# Patient Record
Sex: FEMALE | URBAN - METROPOLITAN AREA
[De-identification: names, ages, dates, MRNs, and addresses within clinical notes are randomized per-mention and may not be internally consistent; named-entity substitution may affect disease eponyms.]

---

## 2023-10-19 ENCOUNTER — APPOINTMENT (RX ONLY)
Dept: URBAN - METROPOLITAN AREA CLINIC 35 | Facility: CLINIC | Age: 38
Setting detail: DERMATOLOGY
End: 2023-10-19

## 2023-10-19 DIAGNOSIS — Z41.9 ENCOUNTER FOR PROCEDURE FOR PURPOSES OTHER THAN REMEDYING HEALTH STATE, UNSPECIFIED: ICD-10-CM

## 2023-10-19 PROCEDURE — ? ADDITIONAL NOTES

## 2023-10-19 PROCEDURE — ? PHOTO-DOCUMENTATION

## 2023-10-19 PROCEDURE — ? BOTOX

## 2023-10-19 PROCEDURE — ? COSMETIC CONSULTATION: BOTOX

## 2023-10-19 PROCEDURE — ? DIAGNOSIS COMMENT

## 2023-10-19 ASSESSMENT — LOCATION DETAILED DESCRIPTION DERM
LOCATION DETAILED: LEFT CENTRAL EYEBROW
LOCATION DETAILED: RIGHT CENTRAL EYEBROW
LOCATION DETAILED: SUPERIOR MID FOREHEAD
LOCATION DETAILED: LEFT MEDIAL EYEBROW
LOCATION DETAILED: LEFT LATERAL FOREHEAD
LOCATION DETAILED: GLABELLA
LOCATION DETAILED: RIGHT SUPERIOR FOREHEAD
LOCATION DETAILED: RIGHT INFERIOR MEDIAL FOREHEAD
LOCATION DETAILED: RIGHT LATERAL FOREHEAD
LOCATION DETAILED: LEFT SUPERIOR FOREHEAD

## 2023-10-19 ASSESSMENT — LOCATION SIMPLE DESCRIPTION DERM
LOCATION SIMPLE: GLABELLA
LOCATION SIMPLE: LEFT FOREHEAD
LOCATION SIMPLE: RIGHT EYEBROW
LOCATION SIMPLE: LEFT EYEBROW
LOCATION SIMPLE: SUPERIOR FOREHEAD
LOCATION SIMPLE: RIGHT FOREHEAD

## 2023-10-19 ASSESSMENT — LOCATION ZONE DERM: LOCATION ZONE: FACE

## 2023-10-19 NOTE — HPI: COSMETIC (BOTOX)
Have You Had Botox Before?: has not had botox
Additional History: Pt states never had Botox before. Pt is interested in forehead and between eyebrows, would like lowest unit. Pt denies pregnancy at this time.

## 2023-10-19 NOTE — PROCEDURE: BOTOX
Periorbital Skin Units: 0
Dilution (U/0.1 Cc): 4
Show Glabellar Units: Yes
Lot #: [id number]
Show Mentalis Units: No
Incrementing Botox Units: By 0.5 Units
Glabellar Complex Units: 16
Forehead Units: 10
Expiration Date (Month Year): 02/2025
Price (Use Numbers Only, No Special Characters Or $): 279
Detail Level: Detailed
Additional Area 1 Location: medial corrugators (total)
Additional Area 2 Location: lateral corrugators (total)
Consent: Written consent obtained. Risks include but not limited to lid/brow ptosis, bruising, swelling, diplopia, temporary effect, incomplete chemical denervation.
Post-Care Instructions: Patient instructed to not lie down for 4 hours and limit physical activity for 24 hours.

## 2023-10-19 NOTE — PROCEDURE: ADDITIONAL NOTES
Detail Level: Simple
Additional Notes: Discussed with pt that she has strength of frontalis on the L>R and pt notes she expresses through raising brows; discouraged injection too low on the frontalis to decrease risk of ptosis; discussed adding additional units on the L but as pt prefers to keep her movements as natural as possible and that she prefers to maintain movement , injected symmetrically
Render Risk Assessment In Note?: no

## 2023-10-19 NOTE — PROCEDURE: DIAGNOSIS COMMENT
Comment: Discussed laser treatment and chemical peels, recommended to follow up with Dr. Dickens if pt opts for CO2 laser or peels. \\nDiscussed Botox is temporary and lasts 3 months, all questions answered
Detail Level: Simple
Render Risk Assessment In Note?: no

## 2024-03-21 ENCOUNTER — APPOINTMENT (RX ONLY)
Dept: URBAN - METROPOLITAN AREA CLINIC 35 | Facility: CLINIC | Age: 39
Setting detail: DERMATOLOGY
End: 2024-03-21

## 2024-03-21 DIAGNOSIS — Z41.9 ENCOUNTER FOR PROCEDURE FOR PURPOSES OTHER THAN REMEDYING HEALTH STATE, UNSPECIFIED: ICD-10-CM

## 2024-03-21 PROCEDURE — ? COSMETIC CONSULTATION: BOTOX

## 2024-03-21 PROCEDURE — ? DIAGNOSIS COMMENT

## 2024-03-21 PROCEDURE — ? BOTOX

## 2024-03-21 PROCEDURE — ? ADDITIONAL NOTES

## 2024-03-21 PROCEDURE — ? PHOTO-DOCUMENTATION

## 2024-03-21 ASSESSMENT — LOCATION SIMPLE DESCRIPTION DERM
LOCATION SIMPLE: SUPERIOR FOREHEAD
LOCATION SIMPLE: GLABELLA
LOCATION SIMPLE: RIGHT FOREHEAD
LOCATION SIMPLE: LEFT FOREHEAD
LOCATION SIMPLE: LEFT EYEBROW
LOCATION SIMPLE: RIGHT EYEBROW

## 2024-03-21 ASSESSMENT — LOCATION DETAILED DESCRIPTION DERM
LOCATION DETAILED: SUPERIOR MID FOREHEAD
LOCATION DETAILED: RIGHT LATERAL FOREHEAD
LOCATION DETAILED: RIGHT CENTRAL EYEBROW
LOCATION DETAILED: RIGHT SUPERIOR FOREHEAD
LOCATION DETAILED: LEFT CENTRAL EYEBROW
LOCATION DETAILED: GLABELLA
LOCATION DETAILED: LEFT LATERAL FOREHEAD
LOCATION DETAILED: LEFT SUPERIOR FOREHEAD
LOCATION DETAILED: RIGHT INFERIOR MEDIAL FOREHEAD
LOCATION DETAILED: LEFT MEDIAL EYEBROW

## 2024-03-21 ASSESSMENT — LOCATION ZONE DERM: LOCATION ZONE: FACE

## 2024-03-21 NOTE — PROCEDURE: ADDITIONAL NOTES
Detail Level: Simple
Additional Notes: Discussed with pt that she has strength of frontalis on the L>R and pt notes she expresses through raising brows; discouraged injection too low on the frontalis to decrease risk of ptosis; discussed adding additional units on the L but as pt prefers to keep her movements as natural as possible and that she prefers to maintain movement , injected symmetrically again as pt happy with last treatment
Render Risk Assessment In Note?: no

## 2024-03-21 NOTE — PROCEDURE: BOTOX
Periorbital Skin Units: 0
Dilution (U/0.1 Cc): 4
Show Glabellar Units: Yes
Lot #: [id number]
Show Mentalis Units: No
Incrementing Botox Units: By 0.5 Units
Glabellar Complex Units: 16
Forehead Units: 10
Expiration Date (Month Year): 03/2025
Price (Use Numbers Only, No Special Characters Or $): 416
Detail Level: Detailed
Additional Area 1 Location: medial corrugators (total)
Additional Area 2 Location: lateral corrugators (total)
Consent: Written consent obtained. Risks include but not limited to lid/brow ptosis, bruising, swelling, diplopia, temporary effect, incomplete chemical denervation.
Post-Care Instructions: Patient instructed to not lie down for 4 hours and limit physical activity for 24 hours.

## 2024-05-03 ENCOUNTER — APPOINTMENT (RX ONLY)
Dept: URBAN - METROPOLITAN AREA CLINIC 35 | Facility: CLINIC | Age: 39
Setting detail: DERMATOLOGY
End: 2024-05-03

## 2024-05-03 DIAGNOSIS — Z41.9 ENCOUNTER FOR PROCEDURE FOR PURPOSES OTHER THAN REMEDYING HEALTH STATE, UNSPECIFIED: ICD-10-CM

## 2024-05-03 PROCEDURE — ? COSMETIC CONSULTATION: BOTOX

## 2024-05-03 PROCEDURE — ? BOTOX

## 2024-05-03 PROCEDURE — ? PHOTO-DOCUMENTATION

## 2024-05-03 ASSESSMENT — LOCATION SIMPLE DESCRIPTION DERM
LOCATION SIMPLE: RIGHT FOREHEAD
LOCATION SIMPLE: LEFT FOREHEAD

## 2024-05-03 ASSESSMENT — LOCATION DETAILED DESCRIPTION DERM
LOCATION DETAILED: RIGHT INFERIOR FOREHEAD
LOCATION DETAILED: LEFT INFERIOR LATERAL FOREHEAD
LOCATION DETAILED: RIGHT LATERAL FOREHEAD
LOCATION DETAILED: RIGHT INFERIOR LATERAL FOREHEAD
LOCATION DETAILED: LEFT LATERAL FOREHEAD
LOCATION DETAILED: LEFT INFERIOR FOREHEAD

## 2024-05-03 ASSESSMENT — LOCATION ZONE DERM: LOCATION ZONE: FACE

## 2024-05-03 NOTE — PROCEDURE: BOTOX
Periorbital Skin Units: 0
Dilution (U/0.1 Cc): 4
Show Glabellar Units: Yes
Lot #: [id number]
Show Mentalis Units: No
Incrementing Botox Units: By 0.5 Units
Expiration Date (Month Year): 03/2025
Detail Level: Detailed
Additional Area 1 Location: medial corrugators (total)
Additional Area 2 Location: lateral corrugators (total)
Consent: Written consent obtained. Risks include but not limited to lid/brow ptosis, bruising, swelling, diplopia, temporary effect, incomplete chemical denervation.
Post-Care Instructions: Patient instructed to not lie down for 4 hours and limit physical activity for 24 hours.

## 2024-10-31 ENCOUNTER — APPOINTMENT (RX ONLY)
Dept: URBAN - METROPOLITAN AREA CLINIC 35 | Facility: CLINIC | Age: 39
Setting detail: DERMATOLOGY
End: 2024-10-31

## 2024-10-31 DIAGNOSIS — Z41.9 ENCOUNTER FOR PROCEDURE FOR PURPOSES OTHER THAN REMEDYING HEALTH STATE, UNSPECIFIED: ICD-10-CM

## 2024-10-31 PROCEDURE — ? COSMETIC CONSULTATION: BOTOX

## 2024-10-31 PROCEDURE — ? ADDITIONAL NOTES

## 2024-10-31 PROCEDURE — ? BOTOX

## 2024-10-31 PROCEDURE — ? PHOTO-DOCUMENTATION

## 2024-10-31 ASSESSMENT — LOCATION SIMPLE DESCRIPTION DERM
LOCATION SIMPLE: SUPERIOR FOREHEAD
LOCATION SIMPLE: RIGHT EYEBROW
LOCATION SIMPLE: LEFT FOREHEAD
LOCATION SIMPLE: GLABELLA
LOCATION SIMPLE: RIGHT FOREHEAD
LOCATION SIMPLE: LEFT EYEBROW

## 2024-10-31 ASSESSMENT — LOCATION DETAILED DESCRIPTION DERM
LOCATION DETAILED: RIGHT INFERIOR MEDIAL FOREHEAD
LOCATION DETAILED: LEFT CENTRAL EYEBROW
LOCATION DETAILED: LEFT LATERAL FOREHEAD
LOCATION DETAILED: RIGHT CENTRAL EYEBROW
LOCATION DETAILED: SUPERIOR MID FOREHEAD
LOCATION DETAILED: LEFT SUPERIOR FOREHEAD
LOCATION DETAILED: RIGHT LATERAL FOREHEAD
LOCATION DETAILED: LEFT MEDIAL EYEBROW
LOCATION DETAILED: GLABELLA
LOCATION DETAILED: RIGHT SUPERIOR FOREHEAD

## 2024-10-31 ASSESSMENT — LOCATION ZONE DERM: LOCATION ZONE: FACE

## 2024-10-31 NOTE — HPI: COSMETIC (BOTOX)
previous_has_had_botox
Additional History: Pt present for Botox; Pt reports tolerated previous treatment well and is present for prevention of additional lines

## 2024-10-31 NOTE — PROCEDURE: BOTOX
Periorbital Skin Units: 0
Dilution (U/0.1 Cc): 4
Show Glabellar Units: Yes
Lot #: [id number]
Show Mentalis Units: No
Incrementing Botox Units: By 0.5 Units
Glabellar Complex Units: 16
Forehead Units: 10
Expiration Date (Month Year): 09/2026
Price (Use Numbers Only, No Special Characters Or $): 416
Detail Level: Detailed
Additional Area 1 Location: medial corrugators (total)
Additional Area 2 Location: lateral corrugators (total)
Consent: Written consent obtained. Risks include but not limited to lid/brow ptosis, bruising, swelling, diplopia, temporary effect, incomplete chemical denervation.
Post-Care Instructions: Patient instructed to not lie down for 4 hours and limit physical activity for 24 hours.

## 2025-06-16 ENCOUNTER — APPOINTMENT (OUTPATIENT)
Dept: URBAN - METROPOLITAN AREA CLINIC 42 | Facility: CLINIC | Age: 40
Setting detail: DERMATOLOGY
End: 2025-06-16

## 2025-06-16 DIAGNOSIS — Z41.9 ENCOUNTER FOR PROCEDURE FOR PURPOSES OTHER THAN REMEDYING HEALTH STATE, UNSPECIFIED: ICD-10-CM

## 2025-06-16 PROCEDURE — ? PHOTO-DOCUMENTATION

## 2025-06-16 PROCEDURE — ? BOTOX

## 2025-06-16 ASSESSMENT — LOCATION DETAILED DESCRIPTION DERM
LOCATION DETAILED: LEFT MEDIAL EYEBROW
LOCATION DETAILED: GLABELLA
LOCATION DETAILED: LEFT CENTRAL EYEBROW
LOCATION DETAILED: LEFT SUPERIOR FOREHEAD
LOCATION DETAILED: SUPERIOR MID FOREHEAD
LOCATION DETAILED: LEFT LATERAL FOREHEAD
LOCATION DETAILED: RIGHT CENTRAL EYEBROW
LOCATION DETAILED: RIGHT INFERIOR MEDIAL FOREHEAD
LOCATION DETAILED: RIGHT LATERAL FOREHEAD
LOCATION DETAILED: RIGHT SUPERIOR FOREHEAD

## 2025-06-16 ASSESSMENT — LOCATION SIMPLE DESCRIPTION DERM
LOCATION SIMPLE: LEFT EYEBROW
LOCATION SIMPLE: SUPERIOR FOREHEAD
LOCATION SIMPLE: RIGHT FOREHEAD
LOCATION SIMPLE: LEFT FOREHEAD
LOCATION SIMPLE: RIGHT EYEBROW
LOCATION SIMPLE: GLABELLA

## 2025-06-16 ASSESSMENT — LOCATION ZONE DERM: LOCATION ZONE: FACE

## 2025-06-16 NOTE — HPI: COSMETIC (BOTOX)
Have You Had Botox Before?: has had botox
Additional History: Pt present for Botox; Pt reports tolerated previous treatment well and is present for prevention of additional lines

## 2025-06-16 NOTE — PROCEDURE: BOTOX
Periorbital Skin Units: 0
Dilution (U/0.1 Cc): 4
Show Glabellar Units: Yes
Lot #: [id number]
Show Mentalis Units: No
Reconstitution Date (Optional): 06/12/2025
Incrementing Botox Units: By 0.5 Units
Glabellar Complex Units: 16
Forehead Units: 10
Expiration Date (Month Year): May 2027
Price (Use Numbers Only, No Special Characters Or $): 416
Detail Level: Detailed
Additional Area 1 Location: medial corrugators (total)
Additional Area 2 Location: lateral corrugators (total)
Consent: Written consent obtained. Risks include but not limited to lid/brow ptosis, bruising, swelling, diplopia, temporary effect, incomplete chemical denervation.
Post-Care Instructions: Patient instructed to not lie down for 4 hours and limit physical activity for 24 hours.